# Patient Record
Sex: FEMALE | Race: WHITE
[De-identification: names, ages, dates, MRNs, and addresses within clinical notes are randomized per-mention and may not be internally consistent; named-entity substitution may affect disease eponyms.]

---

## 2020-07-26 ENCOUNTER — HOSPITAL ENCOUNTER (EMERGENCY)
Dept: HOSPITAL 46 - ED | Age: 40
Discharge: HOME | End: 2020-07-26
Payer: COMMERCIAL

## 2020-07-26 VITALS — HEIGHT: 63 IN | WEIGHT: 198 LBS | BODY MASS INDEX: 35.08 KG/M2
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
## 2020-07-26 DIAGNOSIS — Z79.899: ICD-10-CM

## 2020-07-26 DIAGNOSIS — S52.502A: Primary | ICD-10-CM

## 2020-07-26 DIAGNOSIS — Z88.5: ICD-10-CM

## 2020-07-26 DIAGNOSIS — M54.5: ICD-10-CM

## 2020-07-26 NOTE — XMS
PreManage Notification: EMANUEL CELESTE MRN:[id number]
 
Security Information
 
Security Events
No recent Security Events currently on file
 
 
 
CRITERIA MET
------------
- [facility]
 
 
CARE PROVIDERS
There are no care providers on record at this time.
 
Ricky has no Care Guidelines for this patient.
 
KERI VISIT COUNT (12 MO.)
-------------------------------------------------------------------------------------
1 [facility]
-------------------------------------------------------------------------------------
TOTAL 1
-------------------------------------------------------------------------------------
NOTE: Visits indicate total known visits.
 
ED/UCC VISIT TRACKING (12 MO.)
-------------------------------------------------------------------------------------
07/26/2020 15:27
[facility] [place]
 
TYPE: Emergency
 
COMPLAINT:
- BACK AND WRIST PAIN, INJ
-------------------------------------------------------------------------------------
10/29/2019 11:59
[facility]     [facility]
 
TYPE: Urgent Care
 
DIAGNOSES:
- Unspecified internal derangement of right knee
- Knee Pain
- Pain in right knee
-------------------------------------------------------------------------------------
10/02/2019 12:17
[facility]     [facility]
 
TYPE: Urgent Care
 
DIAGNOSES:
- Pharyngitis
- Acute pharyngitis, unspecified
- Viral infection, unspecified
- Fever
-------------------------------------------------------------------------------------
 
 
 
INPATIENT VISIT TRACKING (12 MO.)
No inpatient visits to display in this time frame
 
[email or web address]

## 2021-07-04 ENCOUNTER — HOSPITAL ENCOUNTER (EMERGENCY)
Dept: HOSPITAL 46 - ED | Age: 41
Discharge: HOME | End: 2021-07-04
Payer: COMMERCIAL

## 2021-07-04 VITALS — HEIGHT: 63 IN | WEIGHT: 203.99 LBS | BODY MASS INDEX: 36.14 KG/M2

## 2021-07-04 DIAGNOSIS — R10.12: Primary | ICD-10-CM

## 2021-07-04 DIAGNOSIS — Z88.5: ICD-10-CM

## 2021-07-04 DIAGNOSIS — R07.89: ICD-10-CM

## 2021-07-04 DIAGNOSIS — F17.200: ICD-10-CM

## 2021-07-04 DIAGNOSIS — Z79.899: ICD-10-CM

## 2021-07-05 NOTE — EKG
[facility]
                                    [address]
                                  [place], [place]  [address]
_________________________________________________________________________________________
                                                                 Signed   
 
 
Normal sinus rhythm
Normal ECG
No previous ECGs available
Confirmed by JOSE ALFREDO OCAMPO MD ([id number]) on 7/5/2021 7:22:11 AM
 
 
 
 
 
 
 
 
 
 
 
 
 
 
 
 
 
 
 
 
 
 
 
 
 
 
 
 
 
 
 
 
 
 
 
 
 
 
 
 
 
    Electronically Signed By: JOSE ALFREDO OCAMPO MD  07/05/21 0722
_________________________________________________________________________________________
PATIENT NAME:     EMANUEL CARRIE                    
MEDICAL RECORD #: [id number]                     Electrocardiogram             
          ACCT #: [id number]  
DATE OF BIRTH:   [birth year]                                       
PHYSICIAN:   JOSE ALFREDO OCAMPO MD                   REPORT #: [id number]
REPORT IS CONFIDENTIAL AND NOT TO BE RELEASED WITHOUT AUTHORIZATION

## 2021-08-06 ENCOUNTER — HOSPITAL ENCOUNTER (EMERGENCY)
Dept: HOSPITAL 46 - ED | Age: 41
Discharge: HOME | End: 2021-08-06
Payer: COMMERCIAL

## 2021-08-06 VITALS — BODY MASS INDEX: 34.45 KG/M2 | HEIGHT: 63 IN | WEIGHT: 194.45 LBS

## 2021-08-06 DIAGNOSIS — Z88.5: ICD-10-CM

## 2021-08-06 DIAGNOSIS — Z79.899: ICD-10-CM

## 2021-08-06 DIAGNOSIS — F17.200: ICD-10-CM

## 2021-08-06 DIAGNOSIS — M54.12: Primary | ICD-10-CM

## 2021-08-07 NOTE — EKG
[facility]
                                    [address]
                                  [place]  [address]
_________________________________________________________________________________________
                                                                 Signed   
 
 
Normal sinus rhythm
Normal ECG
When compared with ECG of 04-JUL-2021 18:43,
Vent. rate has increased BY  30 BPM
Confirmed by ANDRESSA CHU MD ([id number]) on 8/7/2021 9:01:31 AM
 
 
 
 
 
 
 
 
 
 
 
 
 
 
 
 
 
 
 
 
 
 
 
 
 
 
 
 
 
 
 
 
 
 
 
 
 
 
 
 
    Electronically Signed By: ANDRESSA CHU MD  08/07/21 0901
_________________________________________________________________________________________
PATIENT NAME:     EMANUEL CELESTE                    
MEDICAL RECORD #: [id number]                     Electrocardiogram             
          ACCT #: [id number]  
DATE OF BIRTH:   [birth year]                                       
PHYSICIAN:   ANDRESSA CHU MD           REPORT #: [id number]
REPORT IS CONFIDENTIAL AND NOT TO BE RELEASED WITHOUT AUTHORIZATION

## 2022-06-24 ENCOUNTER — HOSPITAL ENCOUNTER (EMERGENCY)
Dept: HOSPITAL 46 - ED | Age: 42
Discharge: HOME | End: 2022-06-24
Payer: COMMERCIAL

## 2022-06-24 VITALS — BODY MASS INDEX: 32.42 KG/M2 | WEIGHT: 182.98 LBS | HEIGHT: 63 IN

## 2022-06-24 DIAGNOSIS — Z88.5: ICD-10-CM
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
## 2022-06-24 DIAGNOSIS — S46.911A: Primary | ICD-10-CM

## 2022-06-24 DIAGNOSIS — F17.200: ICD-10-CM

## 2022-06-24 PROCEDURE — A9270 NON-COVERED ITEM OR SERVICE: HCPCS

## 2022-07-10 ENCOUNTER — HOSPITAL ENCOUNTER (EMERGENCY)
Dept: HOSPITAL 46 - ED | Age: 42
Discharge: HOME | End: 2022-07-10
Payer: COMMERCIAL

## 2022-07-10 VITALS — WEIGHT: 181.99 LBS | BODY MASS INDEX: 32.25 KG/M2 | HEIGHT: 63 IN

## 2022-07-10 DIAGNOSIS — F17.200: ICD-10-CM
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
## 2022-07-10 DIAGNOSIS — S80.01XA: Primary | ICD-10-CM

## 2022-07-10 DIAGNOSIS — Z79.891: ICD-10-CM

## 2022-07-10 NOTE — XMS
PreManage Notification: EMANUEL CELESTE MRN:[id number]
 
Security Information
 
Security Events
No recent Security Events currently on file
 
 
 
CRITERIA MET
------------
- [facility] - 2 Visits in 30 Days
 
 
CARE PROVIDERS
-------------------------------------------------------------------------------------
PRISCILLA PARTIDA     Family Medicine: Adult Medicine     07/27/2020-Current
 
PHONE: Unknown
-------------------------------------------------------------------------------------
 
Ricky has no Care Guidelines for this patient.
 
EANGELIQUE VISIT COUNT (12 MO.)
-------------------------------------------------------------------------------------
1 [facility] Kathy CHRISTIE
 
[address] [place] [address]
-------------------------------------------------------------------------------------
TOTAL 4
-------------------------------------------------------------------------------------
NOTE: Visits indicate total known visits.
 
ED/C VISIT TRACKING (12 MO.)
-------------------------------------------------------------------------------------
07/10/2022 11:48
[facility]
 
TYPE: Emergency
 
COMPLAINT:
- RT KNEE INJURY
-------------------------------------------------------------------------------------
06/24/2022 12:37
[facility]
 
TYPE: Emergency
 
COMPLAINT:
- RT SHOULDER INJ
 
DIAGNOSES:
- Nicotine dependence, unspecified, uncomplicated
- Pain in right shoulder
- Allergy status to narcotic agent
- Strain of unspecified muscle, fascia and tendon at shoulder and upper arm level,
right arm, initial encounter
- Overexertion from strenuous movement or load, initial encounter
-------------------------------------------------------------------------------------
 
04/29/2022 12:39
[facility] Kathy GONZALEZ
 
TYPE: Emergency
 
DIAGNOSES:
- Chest pain, unspecified
- heart racing,dizziness, lt side cp
- Chest Pain
-------------------------------------------------------------------------------------
08/06/2021 16:26
[facility] [place]
 
TYPE: Emergency
 
COMPLAINT:
- L SHOULDER PAIN, TINGLING FINGERS
 
DIAGNOSES:
- Other long term (current) drug therapy
- Allergy status to narcotic agent
- Radiculopathy, cervical region
- Pain in left arm
- Nicotine dependence, unspecified, uncomplicated
-------------------------------------------------------------------------------------
 
 
INPATIENT VISIT TRACKING (12 MO.)
No inpatient visits to display in this time frame
 
[email or web address]

## 2022-09-08 ENCOUNTER — HOSPITAL ENCOUNTER (EMERGENCY)
Dept: HOSPITAL 46 - ED | Age: 42
Discharge: HOME | End: 2022-09-08
Payer: COMMERCIAL

## 2022-09-08 VITALS — HEIGHT: 63 IN | BODY MASS INDEX: 32.07 KG/M2 | WEIGHT: 181 LBS

## 2022-09-08 DIAGNOSIS — F17.200: ICD-10-CM

## 2022-09-08 DIAGNOSIS — S31.815A: Primary | ICD-10-CM

## 2022-09-08 DIAGNOSIS — Z88.5: ICD-10-CM
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
